# Patient Record
Sex: FEMALE | Race: WHITE
[De-identification: names, ages, dates, MRNs, and addresses within clinical notes are randomized per-mention and may not be internally consistent; named-entity substitution may affect disease eponyms.]

---

## 2020-09-30 ENCOUNTER — HOSPITAL ENCOUNTER (EMERGENCY)
Dept: HOSPITAL 75 - ER | Age: 20
LOS: 1 days | Discharge: HOME | End: 2020-10-01
Payer: COMMERCIAL

## 2020-09-30 VITALS — WEIGHT: 157.41 LBS | HEIGHT: 70.08 IN | BODY MASS INDEX: 22.54 KG/M2

## 2020-09-30 DIAGNOSIS — N39.0: ICD-10-CM

## 2020-09-30 DIAGNOSIS — Z20.828: ICD-10-CM

## 2020-09-30 DIAGNOSIS — J10.1: Primary | ICD-10-CM

## 2020-09-30 LAB
APTT PPP: YELLOW S
BACTERIA #/AREA URNS HPF: (no result) /HPF
BILIRUB UR QL STRIP: NEGATIVE
FIBRINOGEN PPP-MCNC: (no result) MG/DL
GLUCOSE UR STRIP-MCNC: NEGATIVE MG/DL
KETONES UR QL STRIP: NEGATIVE
LEUKOCYTE ESTERASE UR QL STRIP: (no result)
NITRITE UR QL STRIP: NEGATIVE
PH UR STRIP: 6.5 [PH] (ref 5–9)
PROT UR QL STRIP: NEGATIVE
RBC #/AREA URNS HPF: (no result) /HPF
SP GR UR STRIP: 1.01 (ref 1.02–1.02)
SQUAMOUS #/AREA URNS HPF: (no result) /HPF
WBC #/AREA URNS HPF: (no result) /HPF

## 2020-09-30 PROCEDURE — 87635 SARS-COV-2 COVID-19 AMP PRB: CPT

## 2020-09-30 PROCEDURE — 36415 COLL VENOUS BLD VENIPUNCTURE: CPT

## 2020-09-30 PROCEDURE — 80053 COMPREHEN METABOLIC PANEL: CPT

## 2020-09-30 PROCEDURE — 84145 PROCALCITONIN (PCT): CPT

## 2020-09-30 PROCEDURE — 87804 INFLUENZA ASSAY W/OPTIC: CPT

## 2020-09-30 PROCEDURE — 87077 CULTURE AEROBIC IDENTIFY: CPT

## 2020-09-30 PROCEDURE — 84703 CHORIONIC GONADOTROPIN ASSAY: CPT

## 2020-09-30 PROCEDURE — 86141 C-REACTIVE PROTEIN HS: CPT

## 2020-09-30 PROCEDURE — 87088 URINE BACTERIA CULTURE: CPT

## 2020-09-30 PROCEDURE — 99284 EMERGENCY DEPT VISIT MOD MDM: CPT

## 2020-09-30 PROCEDURE — 83615 LACTATE (LD) (LDH) ENZYME: CPT

## 2020-09-30 PROCEDURE — 85025 COMPLETE CBC W/AUTO DIFF WBC: CPT

## 2020-09-30 PROCEDURE — 81000 URINALYSIS NONAUTO W/SCOPE: CPT

## 2020-10-01 VITALS — SYSTOLIC BLOOD PRESSURE: 129 MMHG | DIASTOLIC BLOOD PRESSURE: 86 MMHG

## 2020-10-01 LAB
ALBUMIN SERPL-MCNC: 4.5 GM/DL (ref 3.2–4.5)
ALP SERPL-CCNC: 54 U/L (ref 40–136)
ALT SERPL-CCNC: 10 U/L (ref 0–55)
BASOPHILS # BLD AUTO: 0 10^3/UL (ref 0–0.1)
BASOPHILS NFR BLD AUTO: 0 % (ref 0–10)
BILIRUB SERPL-MCNC: 0.9 MG/DL (ref 0.1–1)
BUN/CREAT SERPL: 15
CALCIUM SERPL-MCNC: 9.4 MG/DL (ref 8.5–10.1)
CHLORIDE SERPL-SCNC: 105 MMOL/L (ref 98–107)
CO2 SERPL-SCNC: 20 MMOL/L (ref 21–32)
CREAT SERPL-MCNC: 1.13 MG/DL (ref 0.6–1.3)
EOSINOPHIL # BLD AUTO: 0 10^3/UL (ref 0–0.3)
EOSINOPHIL NFR BLD AUTO: 0 % (ref 0–10)
GFR SERPLBLD BASED ON 1.73 SQ M-ARVRAT: 60 ML/MIN
GLUCOSE SERPL-MCNC: 111 MG/DL (ref 70–105)
HCT VFR BLD CALC: 36 % (ref 35–52)
HGB BLD-MCNC: 12.9 G/DL (ref 11.5–16)
LYMPHOCYTES # BLD AUTO: 1.2 10^3/UL (ref 1–4)
LYMPHOCYTES NFR BLD AUTO: 16 % (ref 12–44)
MANUAL DIFFERENTIAL PERFORMED BLD QL: NO
MCH RBC QN AUTO: 31 PG (ref 25–34)
MCHC RBC AUTO-ENTMCNC: 36 G/DL (ref 32–36)
MCV RBC AUTO: 86 FL (ref 80–99)
MONOCYTES # BLD AUTO: 0.2 10^3/UL (ref 0–1)
MONOCYTES NFR BLD AUTO: 3 % (ref 0–12)
NEUTROPHILS # BLD AUTO: 5.7 10^3/UL (ref 1.8–7.8)
NEUTROPHILS NFR BLD AUTO: 80 % (ref 42–75)
PLATELET # BLD: 229 10^3/UL (ref 130–400)
PMV BLD AUTO: 9.8 FL (ref 9–12.2)
POTASSIUM SERPL-SCNC: 3.5 MMOL/L (ref 3.6–5)
PROT SERPL-MCNC: 7.1 GM/DL (ref 6.4–8.2)
SODIUM SERPL-SCNC: 138 MMOL/L (ref 135–145)
WBC # BLD AUTO: 7.1 10^3/UL (ref 4.3–11)

## 2020-10-01 NOTE — ED GENERAL
General


Chief Complaint:  Psych/Social Disorder


Stated Complaint:  HAD SHOTS TODAY NOW HAVING RXN


Nursing Triage Note:  


C/O FEELING "COLD" X 30 MIN REPORTS RECIEVING FLU VACCINE TODAY.


Nursing Sepsis Screen:  No Definite Risk


Source of Information:  Patient


Exam Limitations:  No Limitations





History of Present Illness


Date Seen by Provider:  Sep 30, 2020


Time Seen by Provider:  23:30


Initial Comments


This 20-year-old young lady presents to the emergency room with complaints of 

chills and rigors that started within the past few hours.  She also appears 

short of breath or is hyperventilating.  She seems very anxious.  She is 

afebrile but tachycardic.  She received her influenza and meningitis vaccines 

today.  She is a [employer] student and works at [employer] which could be considered COVID-19

risk factors.





Allergies and Home Medications


Allergies


Coded Allergies:  


     No Known Drug Allergies (Unverified , 9/30/20)





Home Medications


Cephalexin 500 Mg Capsule, 500 MG PO TID


   Prescribed by: AKILAH SLOAN on 10/1/20 0034


Nitrofurantoin Monohyd/M-Cryst 100 Mg Capsule, 1 TAB PO BID


   Prescribed by: AKILAH SLOAN on 10/1/20 0607


Ondansetron 4 Mg Tab.rapdis, 4 MG SL Q4H PRN for NAUSEA/VOMITING


   Prescribed by: AKILAH SLOAN on 10/1/20 0034





Patient Home Medication List


Home Medication List Reviewed:  Yes





Review of Systems


Review of Systems


Constitutional:  see HPI, chills, weakness


EENTM:  no symptoms reported


Respiratory:  see HPI


Cardiovascular:  see HPI


Gastrointestinal:  nausea


Genitourinary:  no symptoms reported


Pregnant:  No


Musculoskeletal:  muscle cramps


Skin:  no symptoms reported


Psychiatric/Neurological:  Anxiety


Hematologic/Lymphatic:  No Symptoms Reported


Immunological/Allergic:  no symptoms reported





Past Medical-Social-Family Hx


Past Med/Social Hx:  Reviewed Nursing Past Med/Soc Hx


Patient Social History


Alcohol Use:  Denies Use


Recreational Drug Use:  No


Smoking Status:  Never a Smoker


2nd Hand Smoke Exposure:  No


Recent Foreign Travel:  No


Contact w/Someone Who Travel:  No


Recent Infectious Disease Expo:  No


Recent Hopitalizations:  No





Immunizations Up To Date


Tetanus Booster (TDap):  Less than 5yrs


Date of Influenza Vaccine:  Sep 30, 2020





Seasonal Allergies


Seasonal Allergies:  No





Past Medical History


Surgeries:  Yes


Tonsillectomy


Respiratory:  No


Cardiac:  No


Neurological:  No


Pregnant:  No


Genitourinary:  No


Gastrointestinal:  No


Musculoskeletal:  No


Endocrine:  No


HEENT:  No


Cancer:  No


Psychosocial:  Yes


Anxiety


Integumentary:  No


Blood Disorders:  No


Adverse Reaction/Blood Tranf:  No





Physical Exam


Vital Signs





Vital Signs - First Documented








 9/30/20





 23:30


 


Temp 37.0


 


Pulse 126


 


Resp 24


 


B/P (MAP) 146/67 (93)


 


Pulse Ox 100


 


O2 Delivery Room Air





Capillary Refill : Less Than 3 Seconds


Height, Weight, BMI


Height: '"


Weight: lbs. oz. kg; 22.00 BMI


Method:


General Appearance:  WD/WN, Anxious, Thin


HEENT:  PERRL/EOMI, TMs Normal, Normal ENT Inspection, Pharynx Normal


Neck:  Normal Inspection


Respiratory:  Lungs Clear, Normal Breath Sounds, Other 

(tachypnea/hyperventilating)


Cardiovascular:  No Edema, No Murmur, Tachycardia


Gastrointestinal:  Normal Bowel Sounds, Non Tender, Soft


Extremity:  Normal Inspection, Non Tender, No Pedal Edema


Neurologic/Psychiatric:  Alert, Oriented x3, No Motor/Sensory Deficits, CNs II-

XII Norm as Tested, Other (anxious)


Skin:  Normal Color, Warm/Dry





Progress/Results/Core Measures


Suspected Sepsis


Recent Fever Within 48 Hours:  No


Infection Criteria Present:  None


New/Unexplained  Altered Menta:  No


Sepsis Screen:  No Definite Risk


SIRS


Temperature: 


Pulse: 126 


Respiratory Rate: 24


 


Laboratory Tests


9/30/20 23:50: White Blood Count 7.1


Blood Pressure 146 /67 


Mean: 93


 


Laboratory Tests


9/30/20 23:50: 


Creatinine 1.13, Platelet Count 229, Total Bilirubin 0.9








Results/Orders


Lab Results





Laboratory Tests








Test


 9/30/20


23:32 9/30/20


23:50 10/1/20


00:35 Range/Units


 


 


Urine Color YELLOW     


 


Urine Clarity CLOUDY     


 


Urine pH 6.5    5-9  


 


Urine Specific Gravity 1.015 L   1.016-1.022  


 


Urine Protein NEGATIVE    NEGATIVE  


 


Urine Glucose (UA) NEGATIVE    NEGATIVE  


 


Urine Ketones NEGATIVE    NEGATIVE  


 


Urine Nitrite NEGATIVE    NEGATIVE  


 


Urine Bilirubin NEGATIVE    NEGATIVE  


 


Urine Urobilinogen 0.2    < = 1.0  MG/DL


 


Urine Leukocyte Esterase 1+ H   NEGATIVE  


 


Urine RBC (Auto) NEGATIVE    NEGATIVE  


 


Urine RBC NONE     /HPF


 


Urine WBC 5-10 H    /HPF


 


Urine Squamous Epithelial


Cells 25-50 H


 


 


  /HPF





 


Urine Crystals NONE     /LPF


 


Urine Bacteria LARGE H    /HPF


 


Urine Casts NONE     /LPF


 


Urine Mucus NEGATIVE     /LPF


 


Urine Culture Indicated YES     


 


White Blood Count


 


 7.1 


 


 4.3-11.0


10^3/uL


 


Red Blood Count


 


 4.19 


 


 3.80-5.11


10^6/uL


 


Hemoglobin  12.9   11.5-16.0  g/dL


 


Hematocrit  36   35-52  %


 


Mean Corpuscular Volume  86   80-99  fL


 


Mean Corpuscular Hemoglobin  31   25-34  pg


 


Mean Corpuscular Hemoglobin


Concent 


 36 


 


 32-36  g/dL





 


Red Cell Distribution Width  12.2   10.0-14.5  %


 


Platelet Count


 


 229 


 


 130-400


10^3/uL


 


Mean Platelet Volume  9.8   9.0-12.2  fL


 


Immature Granulocyte % (Auto)  0    %


 


Neutrophils (%) (Auto)  80 H  42-75  %


 


Lymphocytes (%) (Auto)  16   12-44  %


 


Monocytes (%) (Auto)  3   0-12  %


 


Eosinophils (%) (Auto)  0   0-10  %


 


Basophils (%) (Auto)  0   0-10  %


 


Neutrophils # (Auto)


 


 5.7 


 


 1.8-7.8


10^3/uL


 


Lymphocytes # (Auto)


 


 1.2 


 


 1.0-4.0


10^3/uL


 


Monocytes # (Auto)


 


 0.2 


 


 0.0-1.0


10^3/uL


 


Eosinophils # (Auto)


 


 0.0 


 


 0.0-0.3


10^3/uL


 


Basophils # (Auto)


 


 0.0 


 


 0.0-0.1


10^3/uL


 


Immature Granulocyte # (Auto)


 


 0.0 


 


 0.0-0.1


10^3/uL


 


Sodium Level  138   135-145  MMOL/L


 


Potassium Level  3.5 L  3.6-5.0  MMOL/L


 


Chloride Level  105     MMOL/L


 


Carbon Dioxide Level  20 L  21-32  MMOL/L


 


Anion Gap  13   5-14  MMOL/L


 


Blood Urea Nitrogen  17   7-18  MG/DL


 


Creatinine


 


 1.13 


 


 0.60-1.30


MG/DL


 


Estimat Glomerular Filtration


Rate 


 60 


 


  





 


BUN/Creatinine Ratio  15    


 


Glucose Level  111 H    MG/DL


 


Calcium Level  9.4   8.5-10.1  MG/DL


 


Corrected Calcium  9.0   8.5-10.1  MG/DL


 


Total Bilirubin  0.9   0.1-1.0  MG/DL


 


Aspartate Amino Transf


(AST/SGOT) 


 18 


 


 5-34  U/L





 


Alanine Aminotransferase


(ALT/SGPT) 


 10 


 


 0-55  U/L





 


Alkaline Phosphatase  54     U/L


 


Lactate Dehydrogenase  167   125-220  U/L


 


C-Reactive Protein High


Sensitivity 


 0.15 


 


 0.00-0.50


MG/DL


 


Total Protein  7.1   6.4-8.2  GM/DL


 


Albumin  4.5   3.2-4.5  GM/DL


 


Procalcitonin  0.03   <0.10  NG/ML


 


Serum Pregnancy Test,


Qualitative 


 NEGATIVE 


 


 NEGATIVE  





 


Coronavirus 2019 (ELIANA)  Negative   Negative  








Micro Results





Microbiology


9/30/20 Influenza Types A,B Antigen (LAURENT) - Final, Complete


          





My Orders





Orders - AKILAH ESPINO MD


Covid 19 Inhouse Test (9/30/20 23:36)


Cbc With Automated Diff (9/30/20 23:36)


Comprehensive Metabolic Panel (9/30/20 23:36)


Hs C Reactive Protein (9/30/20 23:36)


Hcg,Qualitative Serum (9/30/20 23:36)


Procalcitonin (Pct) (9/30/20 23:36)


LDH (9/30/20 23:36)


Ua Culture If Indicated (9/30/20 23:36)


Ed Iv/Invasive Line Start (9/30/20 23:36)


Lactated Ringers (Lr 1000 Ml Iv Solution (9/30/20 23:36)


Ondansetron Injection (Zofran Injectio (9/30/20 23:45)


Influenza A And B Antigens (9/30/20 23:36)


Urine Culture (9/30/20 23:32)


Acetaminophen  Tablet (Tylenol  Tablet) (10/1/20 00:00)


Acetaminophen  Tablet (Tylenol  Tablet) (9/30/20 23:52)


Coronavirus Sars-Cov-2 So 2019 (10/1/20 00:21)


Rx-Oseltamivir Caps (Rx-Tamiflu Caps) (10/1/20 00:23)


Ceftriaxone  For Iv Use (Rocephin  For I (10/1/20 00:30)





Medications Given in ED





Vital Signs/I&O


Capillary Refill : Less Than 3 Seconds








Blood Pressure Mean:                    93








Progress Note :  


Progress Note


Nausea was treated with Zofran.  Chills were treated with Tylenol.  A liter of 

LR was infused.  Patient was feeling much better after these interventions.  

Influenza swab was positive for influenza B.  Patient was started on the take-

home pack of Tamiflu.  Rapid COVID-19 test was negative.  Backup test is 

pending.  Quarantine precautions were reviewed with the patient. Urinary tract 

infection was identified by urinalysis.  Rocephin was given for initial 

treatment. Keflex was initially prescribed and patient requested that this be 

changed to Macrobid.  Pharmacy was called to cancel the Keflex prescription.





Departure


Impression





   Primary Impression:  


   Influenza B


   Additional Impressions:  


   Urinary tract infection


   Qualified Codes:  N39.0 - Urinary tract infection, site not specified


   Person under investigation for COVID-19


Disposition:  01 HOME, SELF-CARE


Condition:  Improved





Departure-Patient Inst.


Decision time for Depature:  00:29


Referrals:  


VALERIE GOMEZ MD (PCP/Family)


Primary Care Physician


Patient Instructions:  Flu, Coronavirus Disease 2019 (COVID-19) Overview





Add. Discharge Instructions:  


Although your rapid COVID-19 test was negative, you are still considered a 

person under investigation for COVID-19 until the backup test results.  Staying 

quarantine until the results are known.


For pain and fever you may take Tylenol (acetaminophen) up to 1000 mg every 6 

hours as needed and/or ibuprofen up to 600 mg every 6 hours as needed.


Uses Zofran (ondansetron) as prescribed for nausea and vomiting.


Complete the entire course of your antibiotics for urinary tract infection and 

your Tamiflu.  Do not skip any doses or stop before the prescribed duration.


Stay home from work, school, etc. until fever and significant symptoms have 

resolved for at least 72 hours.


Return to care.  Worsening symptoms despite following these instructions.  








All discharge instructions reviewed with patient and/or family. Voiced 

understanding.


Scripts


Nitrofurantoin Monohyd/M-Cryst (Macrobid 100 mg Capsule) 100 Mg Capsule


1 TAB PO BID, #14 CAP


   Prov: AKILAH ESPINO MD         10/1/20 


Ondansetron (Ondansetron Odt) 4 Mg Tab.rapdis


4 MG SL Q4H PRN for NAUSEA/VOMITING, #10 TAB


   Prov: AKILAH ESPINO MD         10/1/20 


Cephalexin (Keflex) 500 Mg Capsule


500 MG PO TID, #20 CAP


   Prov: AKILAH ESPINO MD         10/1/20


Work/School Note:  Work Release Form   Date Seen in the Emergency Department:  

Oct 1, 2020


   Return to Work:  Oct 5, 2020


      Other Restrictions Listed Below:  If COVID negative, return when no 

significant symptoms or fever for 72 hr


   Restrictions:  If COVID positive, follow health department guidelines.











AKILAH ESPINO MD         Oct 1, 2020 00:34